# Patient Record
Sex: FEMALE | Race: WHITE | URBAN - METROPOLITAN AREA
[De-identification: names, ages, dates, MRNs, and addresses within clinical notes are randomized per-mention and may not be internally consistent; named-entity substitution may affect disease eponyms.]

---

## 2022-08-05 ENCOUNTER — OFFICE VISIT (OUTPATIENT)
Dept: URBAN - METROPOLITAN AREA CLINIC 23 | Facility: CLINIC | Age: 72
End: 2022-08-05
Payer: COMMERCIAL

## 2022-08-05 DIAGNOSIS — H50.15 ALTERNATING EXOTROPIA: ICD-10-CM

## 2022-08-05 DIAGNOSIS — R51.9 HEADACHE, UNSPECIFIED: Primary | ICD-10-CM

## 2022-08-05 PROCEDURE — 99203 OFFICE O/P NEW LOW 30 MIN: CPT | Performed by: OPTOMETRIST

## 2022-08-05 ASSESSMENT — KERATOMETRY
OS: 47.00
OD: 47.13

## 2022-08-05 ASSESSMENT — INTRAOCULAR PRESSURE
OD: 16
OS: 10

## 2022-08-05 NOTE — IMPRESSION/PLAN
Impression: Headache, unspecified: R51.9. Bilateral. Condition: established, stable. Plan: Discussed findings. No ocular pathology noted, no ONH swelling and IOP in normal range. Recommend f/u with PCP and neurology consult. Call with any vision changes.

## 2022-08-05 NOTE — IMPRESSION/PLAN
Impression: Alternating exotropia: H50.15. Bilateral. Condition: stable. Plan: Discussed findings. Alternating exotropia, prefers OS. Longstanding since childhood per pt, managed with prism in glasses rx. Recommend updated refraction at patient's convenience.

## 2022-09-12 ENCOUNTER — OFFICE VISIT (OUTPATIENT)
Dept: URBAN - METROPOLITAN AREA CLINIC 16 | Facility: CLINIC | Age: 72
End: 2022-09-12
Payer: COMMERCIAL

## 2022-09-12 DIAGNOSIS — H50.15 ALTERNATING EXOTROPIA: Primary | ICD-10-CM

## 2022-09-12 PROCEDURE — 92012 INTRM OPH EXAM EST PATIENT: CPT | Performed by: OPTOMETRIST

## 2022-09-12 ASSESSMENT — VISUAL ACUITY
OD: 20/20
OS: 20/20

## 2022-09-12 ASSESSMENT — INTRAOCULAR PRESSURE
OD: 16
OS: 14

## 2022-09-12 NOTE — IMPRESSION/PLAN
Impression: Alternating exotropia: H50.15 Bilateral. Plan: Patient prefers to continue using readers.  Declined prism